# Patient Record
Sex: FEMALE | Race: ASIAN | URBAN - METROPOLITAN AREA
[De-identification: names, ages, dates, MRNs, and addresses within clinical notes are randomized per-mention and may not be internally consistent; named-entity substitution may affect disease eponyms.]

---

## 2017-06-04 ENCOUNTER — EMERGENCY (EMERGENCY)
Facility: HOSPITAL | Age: 22
LOS: 1 days | Discharge: HOME | End: 2017-06-04
Attending: EMERGENCY MEDICINE | Admitting: EMERGENCY MEDICINE
Payer: COMMERCIAL

## 2017-06-04 VITALS
DIASTOLIC BLOOD PRESSURE: 59 MMHG | OXYGEN SATURATION: 100 % | RESPIRATION RATE: 16 BRPM | SYSTOLIC BLOOD PRESSURE: 110 MMHG | TEMPERATURE: 99 F | WEIGHT: 117.95 LBS | HEART RATE: 75 BPM | HEIGHT: 62 IN

## 2017-06-04 VITALS
RESPIRATION RATE: 16 BRPM | HEART RATE: 66 BPM | SYSTOLIC BLOOD PRESSURE: 90 MMHG | TEMPERATURE: 98 F | OXYGEN SATURATION: 99 % | DIASTOLIC BLOOD PRESSURE: 59 MMHG

## 2017-06-04 PROCEDURE — 99283 EMERGENCY DEPT VISIT LOW MDM: CPT | Mod: 25

## 2017-06-04 PROCEDURE — 10060 I&D ABSCESS SIMPLE/SINGLE: CPT

## 2017-06-04 PROCEDURE — 99282 EMERGENCY DEPT VISIT SF MDM: CPT | Mod: 25

## 2017-06-04 RX ORDER — IBUPROFEN 200 MG
600 TABLET ORAL ONCE
Qty: 0 | Refills: 0 | Status: COMPLETED | OUTPATIENT
Start: 2017-06-04 | End: 2017-06-04

## 2017-06-04 RX ADMIN — Medication 100 MILLIGRAM(S): at 20:36

## 2017-06-04 RX ADMIN — Medication 600 MILLIGRAM(S): at 20:25

## 2017-06-04 NOTE — ED PROVIDER NOTE - MEDICAL DECISION MAKING DETAILS
21 y.o. F abscess ant abd wall - I&D - doxy - outpt f/u 21 y.o. F abscess ant abd wall - I&D - doxy - outpt f/u - pt going away tomorrow - will remove the packing in 2 days, advised pt to make sure she knows where the local ED and urgent cares are while away

## 2017-06-04 NOTE — ED ADULT NURSE NOTE - OBJECTIVE STATEMENT
Has had a painless lump to abdominal wall for several months. In past 2 daqys the lump has increased in size and become painful.

## 2017-06-04 NOTE — ED PROVIDER NOTE - OBJECTIVE STATEMENT
21 y.o. F c/o bump on stomach 21 y.o. F c/o bump on stomach - has had a small bump left anterior abd wall for a long time, now past few days is getting bigger and it is very tender - has a small black spot in middle and did have a tiny bit of pus come out yesterday - but no more - no fever - feels well otherwise

## 2017-06-04 NOTE — ED ADULT NURSE NOTE - CHPI ED SYMPTOMS NEG
no weakness/no decreased eating/drinking/no nausea/no numbness/no dizziness/no tingling/no chills/no vomiting/no fever

## 2017-08-15 PROBLEM — Z00.00 ENCOUNTER FOR PREVENTIVE HEALTH EXAMINATION: Status: ACTIVE | Noted: 2017-08-15

## 2017-08-21 ENCOUNTER — APPOINTMENT (OUTPATIENT)
Dept: NEUROSURGERY | Facility: CLINIC | Age: 22
End: 2017-08-21
Payer: MEDICAID

## 2017-08-21 VITALS
TEMPERATURE: 98 F | WEIGHT: 110 LBS | HEIGHT: 62 IN | HEART RATE: 68 BPM | DIASTOLIC BLOOD PRESSURE: 60 MMHG | SYSTOLIC BLOOD PRESSURE: 110 MMHG | BODY MASS INDEX: 20.24 KG/M2 | OXYGEN SATURATION: 98 %

## 2017-08-21 DIAGNOSIS — M54.5 LOW BACK PAIN: ICD-10-CM

## 2017-08-21 DIAGNOSIS — Z78.9 OTHER SPECIFIED HEALTH STATUS: ICD-10-CM

## 2017-08-21 DIAGNOSIS — Z87.09 PERSONAL HISTORY OF OTHER DISEASES OF THE RESPIRATORY SYSTEM: ICD-10-CM

## 2017-08-21 PROCEDURE — 99203 OFFICE O/P NEW LOW 30 MIN: CPT

## 2017-08-21 RX ORDER — NAPROXEN 500 MG/1
500 TABLET ORAL
Refills: 0 | Status: ACTIVE | COMMUNITY

## 2017-08-22 ENCOUNTER — TRANSCRIPTION ENCOUNTER (OUTPATIENT)
Age: 22
End: 2017-08-22

## 2022-04-29 NOTE — ED ADULT NURSE NOTE - SEPSIS SCREEN NEW INFECTION TYPE
Consent 1/Introductory Paragraph: The rationale for Mohs was explained to the patient and consent was obtained. The risks, benefits and alternatives to therapy were discussed in detail. Specifically, the risks of infection, scarring, bleeding, prolonged wound healing, incomplete removal, allergy to anesthesia, nerve injury and recurrence were addressed. Prior to the procedure, the treatment site was clearly identified and confirmed by the patient. All components of Universal Protocol/PAUSE Rule completed. abscess

## 2025-05-13 NOTE — ED PROVIDER NOTE - DIAGNOSIS COUNSELING, MDM
DATE: 5/12/2025    PREOPERATIVE DIAGNOSIS:   appendicitis    POSTOPERATIVE DIAGNOSIS:  same    PROCEDURE:  Robotic assisted appendectomy    SURGEON:  Judd Gerardo MD        ANESTHESIA:  General endotracheal    INDICATION:  See preoperative note.    FINDINGS:  Appendix distended proximally, the base of the appendix was distended, requiring dissection of the proximal cecum as the site of division.  Also, adhesions were present in the mid abdomen that required mobilization for adequate port placement and exposure to the right lower abdomen. Procedure required additional time because of above, approximately 20 minutes.    ESTIMATED BLOOD LOSS:  minimal    DESCRIPTION OF PROCEDURE:  Abdomen prepped and draped, 5 mm optical port was placed in the left upper abdomen and pneumoperitoneum was established.  Laparoscopic visualization through the right abdomen was obstructed by adhesions in the mid abdomen.  An 8 mm robotic port was placed in the left mid abdomen and some adhesions were mobilized laparoscopically, then the third port could be placed in the left lower abdomen, and exposure to the right and lower abdomen could be achieved.  The 5 mm port was exchanged for a 12 mm robotic port.  The robot was docked.  The appendix was located in the right lower abdomen.  It was markedly distended but not ruptured.  It was carefully elevated and mesoappendix was divided.  The appendix was distended to the base of the cecum, and this required dissection of some adipose tissue from the proximal cecum in order to find a satisfactory site to divide the proximal cecum.  The endoscopic stapler with a blue load was passed across the proximal cecum with attention to the location of the ileocecal valve.  Proximal cecum was divided and the specimen was placed within an endoscopic pouch.  Hemostasis was satisfactory.  Instruments were removed.  The robot was undocked and the appendix was removed at the 12 mm site which was closed  laparoscopically.  Pneumoperitoneum was released and ports were removed and skin incisions were closed with absorbable suture.        NINO Gerardo MD        conducted a detailed discussion... I had a detailed discussion with the patient and/or guardian regarding the historical points, exam findings, and any diagnostic results supporting the discharge/admit diagnosis.